# Patient Record
Sex: FEMALE
[De-identification: names, ages, dates, MRNs, and addresses within clinical notes are randomized per-mention and may not be internally consistent; named-entity substitution may affect disease eponyms.]

---

## 2018-10-12 PROBLEM — Z00.00 ENCOUNTER FOR PREVENTIVE HEALTH EXAMINATION: Status: ACTIVE | Noted: 2018-10-12

## 2018-10-15 ENCOUNTER — APPOINTMENT (OUTPATIENT)
Dept: ENDOCRINOLOGY | Facility: CLINIC | Age: 38
End: 2018-10-15
Payer: MEDICAID

## 2018-10-15 VITALS
BODY MASS INDEX: 26.87 KG/M2 | SYSTOLIC BLOOD PRESSURE: 120 MMHG | HEIGHT: 62 IN | WEIGHT: 146 LBS | DIASTOLIC BLOOD PRESSURE: 80 MMHG | HEART RATE: 73 BPM

## 2018-10-15 PROCEDURE — 99203 OFFICE O/P NEW LOW 30 MIN: CPT

## 2018-12-10 ENCOUNTER — APPOINTMENT (OUTPATIENT)
Dept: ENDOCRINOLOGY | Facility: CLINIC | Age: 38
End: 2018-12-10
Payer: MEDICAID

## 2018-12-10 VITALS
HEIGHT: 62 IN | WEIGHT: 143 LBS | DIASTOLIC BLOOD PRESSURE: 80 MMHG | HEART RATE: 90 BPM | BODY MASS INDEX: 26.31 KG/M2 | SYSTOLIC BLOOD PRESSURE: 120 MMHG

## 2018-12-10 PROCEDURE — 99215 OFFICE O/P EST HI 40 MIN: CPT

## 2019-02-25 ENCOUNTER — APPOINTMENT (OUTPATIENT)
Dept: ENDOCRINOLOGY | Facility: CLINIC | Age: 39
End: 2019-02-25
Payer: MEDICAID

## 2019-02-25 VITALS
HEIGHT: 62 IN | HEART RATE: 100 BPM | SYSTOLIC BLOOD PRESSURE: 120 MMHG | WEIGHT: 143 LBS | DIASTOLIC BLOOD PRESSURE: 80 MMHG | BODY MASS INDEX: 26.31 KG/M2

## 2019-02-25 PROCEDURE — 99215 OFFICE O/P EST HI 40 MIN: CPT

## 2019-02-25 RX ORDER — DULAGLUTIDE 0.75 MG/.5ML
0.75 INJECTION, SOLUTION SUBCUTANEOUS
Qty: 1 | Refills: 3 | Status: COMPLETED | COMMUNITY
Start: 2018-12-10 | End: 2019-02-25

## 2019-02-25 NOTE — END OF VISIT
Include Location In Plan?: No Detail Level: Generalized Detail Level: Simple [Time Spent: ___ minutes] : I have spent [unfilled] minutes of face to face time with the patient Detail Level: Zone

## 2019-02-25 NOTE — ASSESSMENT
[FreeTextEntry1] : 1) DM2: Improving control, A1C on 11/2018 at 6.5%. target of <7%. Natural hx of the disease and importance of treatment targets discussed at length, she verbalized understanding. ADA diet and importance of exercise discussed at length. Plan is to increase metformin ER to full dose as tolerated and introduce SGLt-2 inh today (farxiga 5 mg PO QD). r/b/a and s.e including polyuria, UTI and pascale's reviewed at length. Verbalized understanding and agrees with treatment plan, will contact MD and seek emergency medical care if condition changes.\par microalbumin, lipids and labs checked on 12/2018, microalbumin negative and CMP wnl. Discuss vaccines and podiatry/opthalmology referrals on NV \par 2) Weight gain:  complicated by DM2. Discussed medical  strategies. Pt would like to try lifestyle modifications and SGLT-2 inh therapy in the future. Reassess on the NV for at least ~5% TBW loss.\par 3) Dyslipidemia: LDL at 101 on 12/2018. P twould like to try LSM and weight loss first. No plans for pregnancy so we will consider statin in the future.\par  [Carbohydrate Consistent Diet] : carbohydrate consistent diet [Long Term Vascular Complications] : long term vascular complications of diabetes [Importance of Diet and Exercise] : importance of diet and exercise to improve glycemic control, achieve weight loss and improve cardiovascular health [Incretin Mimetic Therapy] : Risks and benefits of incretin mimetic therapy were discussed with the patient including nauseau, pancreatitis and potential risk of medullary thyroid cancer

## 2019-02-25 NOTE — HISTORY OF PRESENT ILLNESS
[FreeTextEntry1] : 38 y.o F e/ Hx of DM2.\par Here for initial evaluation, generally feels well and endorses no acute complaints.\par Reports original diagnosis was made w/ gestational DM 3 years ago. Used glyburide. Baby was born healthy w/o macrosomia per pt. Since, she reports inadequate A1C control. Last A1C she remember was 11, which droppped to 6 on metfomrin 1000 HLC QD. She reports GI heartburn and cramps. Saw ophthalmology this year. Is not aware of any other micro-macrovascular complications. \par \par 12/2018: Here for /fu, generally feels well and endorses no acute complaints. No interval events since LV. Today notes 4 lb weight loss since LV.\par She monitors FSG at home, reports AC Brk readings usually ~140 since restarting metformin 2 months ago. \par \par 2/2019: Here for /fu, generally feels well and endorses no acute complaints. No interval events since LV. Today reports she was unable to tolerate Trulicity (reports worsening fatigue and stomach cramps). Still reports post prandial hyperglycemia ~180. Notes full metformin tolerance.\par She otherwise denies any f/c, CP, SOB, palpitations, tremors, depressed mood, anxiety, palpitations, n/v, stool/urinary abn, skin/weight changes, heat/cold intolerance, HAs, breast/nipple changes, polyuria/polydipsia/nocturia or other complaints.\par

## 2019-04-29 ENCOUNTER — APPOINTMENT (OUTPATIENT)
Dept: ENDOCRINOLOGY | Facility: CLINIC | Age: 39
End: 2019-04-29
Payer: MEDICAID

## 2019-04-29 VITALS
HEART RATE: 59 BPM | DIASTOLIC BLOOD PRESSURE: 80 MMHG | HEIGHT: 62 IN | WEIGHT: 147 LBS | BODY MASS INDEX: 27.05 KG/M2 | SYSTOLIC BLOOD PRESSURE: 118 MMHG

## 2019-04-29 PROCEDURE — 99215 OFFICE O/P EST HI 40 MIN: CPT

## 2019-04-29 RX ORDER — AMMONIUM LACTATE 12 %
12 CREAM (GRAM) TOPICAL
Qty: 1 | Refills: 5 | Status: ACTIVE | COMMUNITY
Start: 2019-04-29 | End: 1900-01-01

## 2019-04-29 RX ORDER — BUPROPION HYDROCHLORIDE 150 MG/1
150 TABLET, EXTENDED RELEASE ORAL
Qty: 120 | Refills: 5 | Status: ACTIVE | COMMUNITY
Start: 2019-04-29 | End: 1900-01-01

## 2019-04-29 NOTE — HISTORY OF PRESENT ILLNESS
[FreeTextEntry1] : 38 y.o F e/ Hx of DM2.\par Here for initial evaluation, generally feels well and endorses no acute complaints.\par Reports original diagnosis was made w/ gestational DM 3 years ago. Used glyburide. Baby was born healthy w/o macrosomia per pt. Since, she reports inadequate A1C control. Last A1C she remember was 11, which droppped to 6 on metfomrin 1000 HLC QD. She reports GI heartburn and cramps. Saw ophthalmology this year. Is not aware of any other micro-macrovascular complications. \par \par 12/2018: Here for /fu, generally feels well and endorses no acute complaints. No interval events since LV. Today notes 4 lb weight loss since LV.\par She monitors FSG at home, reports AC Brk readings usually ~140 since restarting metformin 2 months ago. \par \par 2/2019: Here for /fu, generally feels well and endorses no acute complaints. No interval events since LV. Today reports she was unable to tolerate Trulicity (reports worsening fatigue and stomach cramps). Still reports post prandial hyperglycemia ~180. Notes full metformin tolerance.\par \par \par 4/2019: Here for /fu, generally feels well and endorses no acute complaints. No interval events since LV. Today reports full tolerance of farxiga, denies UTIs, vaginosis or polyuria. Weight increased but reports increased anxiety driven binge eating and appetite.\par She otherwise denies any f/c, CP, SOB, palpitations, tremors, depressed mood, anxiety, palpitations, n/v, stool/urinary abn, skin/weight changes, heat/cold intolerance, HAs, breast/nipple changes, polyuria/polydipsia/nocturia or other complaints.\par

## 2019-04-29 NOTE — ASSESSMENT
[Carbohydrate Consistent Diet] : carbohydrate consistent diet [Long Term Vascular Complications] : long term vascular complications of diabetes [Importance of Diet and Exercise] : importance of diet and exercise to improve glycemic control, achieve weight loss and improve cardiovascular health [Incretin Mimetic Therapy] : Risks and benefits of incretin mimetic therapy were discussed with the patient including nauseau, pancreatitis and potential risk of medullary thyroid cancer [FreeTextEntry1] : 1) DM2: Improving control, A1C on 11/2018 at 6.5%. target of <7%. Natural hx of the disease and importance of treatment targets discussed at length, she verbalized understanding. ADA diet and importance of exercise discussed at length. Plan is to increase metformin ER to full dose as tolerated and introduce SGLt-2 inh today (farxiga 5 mg PO QD). r/b/a and s.e including polyuria, UTI and pascale's reviewed at length. Verbalized understanding and agrees with treatment plan, will contact MD and seek emergency medical care if condition changes.\par microalbumin, lipids and labs checked on 12/2018, microalbumin negative and CMP wnl. Discuss vaccines and podiatry/opthalmology referrals on NV \par 2) Weight gain:  complicated by DM2. Discussed medical  strategies. Pt would like to try lifestyle modifications and SGLT-2 inh therapy in the future. Reassess on the NV for at least ~5% TBW loss. Start bupropion and titrate as tolerated for anxiety driven binge eating.\par 3) Dyslipidemia: LDL at 101 on 12/2018. P twould like to try LSM and weight loss first. No plans for pregnancy so we will consider statin in the future.\par

## 2019-07-29 ENCOUNTER — APPOINTMENT (OUTPATIENT)
Dept: ENDOCRINOLOGY | Facility: CLINIC | Age: 39
End: 2019-07-29
Payer: MEDICAID

## 2019-07-29 VITALS
HEIGHT: 62 IN | HEART RATE: 72 BPM | DIASTOLIC BLOOD PRESSURE: 80 MMHG | SYSTOLIC BLOOD PRESSURE: 120 MMHG | BODY MASS INDEX: 20.98 KG/M2 | WEIGHT: 114 LBS

## 2019-07-29 PROCEDURE — 99214 OFFICE O/P EST MOD 30 MIN: CPT

## 2019-07-29 NOTE — PHYSICAL EXAM
[Alert] : alert [No Acute Distress] : no acute distress [Well Nourished] : well nourished [Well Developed] : well developed [Normal Sclera/Conjunctiva] : normal sclera/conjunctiva [EOMI] : extra ocular movement intact [Normal Oropharynx] : the oropharynx was normal [No Proptosis] : no proptosis [No Thyroid Nodules] : there were no palpable thyroid nodules [Thyroid Not Enlarged] : the thyroid was not enlarged [Clear to Auscultation] : lungs were clear to auscultation bilaterally [No Accessory Muscle Use] : no accessory muscle use [No Respiratory Distress] : no respiratory distress [Normal Rate] : heart rate was normal  [Normal S1, S2] : normal S1 and S2 [Regular Rhythm] : with a regular rhythm [Pedal Pulses Normal] : the pedal pulses are present [No Edema] : there was no peripheral edema [Normal Bowel Sounds] : normal bowel sounds [Not Tender] : non-tender [Soft] : abdomen soft [Not Distended] : not distended [Post Cervical Nodes] : posterior cervical nodes [Anterior Cervical Nodes] : anterior cervical nodes [Axillary Nodes] : axillary nodes [Normal] : normal and non tender [No Spinal Tenderness] : no spinal tenderness [Spine Straight] : spine straight [No Stigmata of Cushings Syndrome] : no stigmata of cushings syndrome [Normal Gait] : normal gait [Normal Strength/Tone] : muscle strength and tone were normal [Acanthosis Nigricans] : no acanthosis nigricans [Normal Reflexes] : deep tendon reflexes were 2+ and symmetric [No Rash] : no rash [No Tremors] : no tremors [Oriented x3] : oriented to person, place, and time

## 2019-07-29 NOTE — ASSESSMENT
[FreeTextEntry1] : 1) DM2: Improving control, A1C on 7/2019 at 7.2%. target of <7%. Natural hx of the disease and importance of treatment targets discussed at length, she verbalized understanding. ADA diet and importance of exercise discussed at length. Plan is to increase metformin ER to full dose as tolerated and SGLt-2 inh today (farxiga 5 mg PO QD). r/b/a and s.e including polyuria, UTI and pascale's reviewed at length. Verbalized understanding and agrees with treatment plan, will contact MD and seek emergency medical care if condition changes.\par microalbumin, lipids and labs checked on 12/2018, microalbumin negative and CMP wnl. Discuss vaccines and podiatry/opthalmology referrals on NV \par 2) Weight gain:  complicated by DM2. Discussed medical  strategies. Pt would like to try lifestyle modifications and SGLT-2 inh therapy in the future. Reassess on the NV for at least ~5% TBW loss. Start bupropion and titrate as tolerated for anxiety driven binge eating.\par 3) Dyslipidemia: LDL at 101 on 12/2018. P twould like to try LSM and weight loss first. No plans for pregnancy so we will consider statin in the future.\par  [Carbohydrate Consistent Diet] : carbohydrate consistent diet [Importance of Diet and Exercise] : importance of diet and exercise to improve glycemic control, achieve weight loss and improve cardiovascular health [Long Term Vascular Complications] : long term vascular complications of diabetes [Incretin Mimetic Therapy] : Risks and benefits of incretin mimetic therapy were discussed with the patient including nauseau, pancreatitis and potential risk of medullary thyroid cancer

## 2019-07-29 NOTE — HISTORY OF PRESENT ILLNESS
[FreeTextEntry1] : 38 y.o F e/ Hx of DM2.\par Here for initial evaluation, generally feels well and endorses no acute complaints.\par Reports original diagnosis was made w/ gestational DM 3 years ago. Used glyburide. Baby was born healthy w/o macrosomia per pt. Since, she reports inadequate A1C control. Last A1C she remember was 11, which droppped to 6 on metfomrin 1000 HLC QD. She reports GI heartburn and cramps. Saw ophthalmology this year. Is not aware of any other micro-macrovascular complications. \par \par 12/2018: Here for /fu, generally feels well and endorses no acute complaints. No interval events since LV. Today notes 4 lb weight loss since LV.\par She monitors FSG at home, reports AC Brk readings usually ~140 since restarting metformin 2 months ago. \par \par 2/2019: Here for /fu, generally feels well and endorses no acute complaints. No interval events since LV. Today reports she was unable to tolerate Trulicity (reports worsening fatigue and stomach cramps). Still reports post prandial hyperglycemia ~180. Notes full metformin tolerance.\par \par \par 7/2019: Here for /fu, generally feels well and endorses no acute complaints. No interval events since LV. Today reports full tolerance of farxiga, denies UTIs, vaginosis or polyuria. Weight increased but reports increased anxiety driven binge eating and appetite.\par She otherwise denies any f/c, CP, SOB, palpitations, tremors, depressed mood, anxiety, palpitations, n/v, stool/urinary abn, skin/weight changes, heat/cold intolerance, HAs, breast/nipple changes, polyuria/polydipsia/nocturia or other complaints.\par

## 2019-11-25 ENCOUNTER — APPOINTMENT (OUTPATIENT)
Dept: ENDOCRINOLOGY | Facility: CLINIC | Age: 39
End: 2019-11-25
Payer: MEDICAID

## 2019-11-25 VITALS
BODY MASS INDEX: 26.87 KG/M2 | SYSTOLIC BLOOD PRESSURE: 110 MMHG | DIASTOLIC BLOOD PRESSURE: 70 MMHG | HEART RATE: 79 BPM | HEIGHT: 62 IN | WEIGHT: 146 LBS

## 2019-11-25 PROCEDURE — 99215 OFFICE O/P EST HI 40 MIN: CPT

## 2019-11-25 RX ORDER — LANCETS 33 GAUGE
EACH MISCELLANEOUS
Qty: 1 | Refills: 11 | Status: ACTIVE | COMMUNITY
Start: 2019-11-25 | End: 1900-01-01

## 2019-11-25 RX ORDER — BLOOD SUGAR DIAGNOSTIC
STRIP MISCELLANEOUS
Qty: 1 | Refills: 11 | Status: ACTIVE | COMMUNITY
Start: 2019-11-25 | End: 1900-01-01

## 2019-11-25 NOTE — ASSESSMENT
[Carbohydrate Consistent Diet] : carbohydrate consistent diet [Long Term Vascular Complications] : long term vascular complications of diabetes [Importance of Diet and Exercise] : importance of diet and exercise to improve glycemic control, achieve weight loss and improve cardiovascular health [Incretin Mimetic Therapy] : Risks and benefits of incretin mimetic therapy were discussed with the patient including nauseau, pancreatitis and potential risk of medullary thyroid cancer [FreeTextEntry1] : 1) DM2: Improving control, A1C on 7/2019 at 7.2%. target of <7%. Natural hx of the disease and importance of treatment targets discussed at length, she verbalized understanding. ADA diet and importance of exercise discussed at length. Plan is to increase metformin ER to full dose as tolerated and SGLt-2 inh today (farxiga 5 mg PO QD). r/b/a and s.e including polyuria, UTI and pascale's reviewed at length. Verbalized understanding and agrees with treatment plan, will contact MD and seek emergency medical care if condition changes.\par microalbumin, lipids and labs checked on 12/2018, microalbumin negative and CMP wnl. Discuss vaccines and podiatry/opthalmology referrals on NV \par 2) Weight gain:  complicated by DM2. Discussed medical  strategies. Pt would like to try lifestyle modifications and SGLT-2 inh therapy in the future. Reassess on the NV for at least ~5% TBW loss. Start bupropion and titrate as tolerated for anxiety driven binge eating.\par 3) Dyslipidemia: LDL at 101 on 12/2018. P twould like to try LSM and weight loss first. No plans for pregnancy so we will consider statin in the future.\par

## 2020-03-02 ENCOUNTER — APPOINTMENT (OUTPATIENT)
Dept: ENDOCRINOLOGY | Facility: CLINIC | Age: 40
End: 2020-03-02
Payer: MEDICAID

## 2020-03-02 VITALS
DIASTOLIC BLOOD PRESSURE: 70 MMHG | HEART RATE: 68 BPM | HEIGHT: 62 IN | BODY MASS INDEX: 27.23 KG/M2 | SYSTOLIC BLOOD PRESSURE: 110 MMHG | WEIGHT: 148 LBS

## 2020-03-02 PROCEDURE — 99214 OFFICE O/P EST MOD 30 MIN: CPT

## 2020-03-02 RX ORDER — SITAGLIPTIN AND METFORMIN HYDROCHLORIDE 100; 1000 MG/1; MG/1
100-1000 TABLET, FILM COATED, EXTENDED RELEASE ORAL DAILY
Qty: 90 | Refills: 3 | Status: COMPLETED | COMMUNITY
Start: 2019-11-25 | End: 2020-03-02

## 2020-03-02 NOTE — HISTORY OF PRESENT ILLNESS
[FreeTextEntry1] : 39 y.o F e/ Hx of DM2.\par Here for initial evaluation, generally feels well and endorses no acute complaints.\par Reports original diagnosis was made w/ gestational DM 3 years ago. Used glyburide. Baby was born healthy w/o macrosomia per pt. Since, she reports inadequate A1C control. Last A1C she remember was 11, which droppped to 6 on metfomrin 1000 HLC QD. She reports GI heartburn and cramps. Saw ophthalmology this year. Is not aware of any other micro-macrovascular complications. \par \par 12/2018: Here for /fu, generally feels well and endorses no acute complaints. No interval events since LV. Today notes 4 lb weight loss since LV.\par She monitors FSG at home, reports AC Brk readings usually ~140 since restarting metformin 2 months ago. \par \par 2/2019: Here for /fu, generally feels well and endorses no acute complaints. No interval events since LV. Today reports she was unable to tolerate Trulicity (reports worsening fatigue and stomach cramps). Still reports post prandial hyperglycemia ~180. Notes full metformin tolerance.\par \par \par 3/2020: Here for /fu, generally feels well and endorses no acute complaints. No interval events since LV. Today reports full tolerance of farxiga, denies UTIs, vaginosis or polyuria. Weight increased but reports increased anxiety driven binge eating and appetite.\par She otherwise denies any f/c, CP, SOB, palpitations, tremors, depressed mood, anxiety, palpitations, n/v, stool/urinary abn, skin/weight changes, heat/cold intolerance, HAs, breast/nipple changes, polyuria/polydipsia/nocturia or other complaints.\par

## 2020-03-02 NOTE — ASSESSMENT
[Long Term Vascular Complications] : long term vascular complications of diabetes [Carbohydrate Consistent Diet] : carbohydrate consistent diet [Incretin Mimetic Therapy] : Risks and benefits of incretin mimetic therapy were discussed with the patient including nauseau, pancreatitis and potential risk of medullary thyroid cancer [Importance of Diet and Exercise] : importance of diet and exercise to improve glycemic control, achieve weight loss and improve cardiovascular health [FreeTextEntry1] : 1) DM2: Improving control, A1C on 7/2019 at 7.2%. target of <7%. Natural hx of the disease and importance of treatment targets discussed at length, she verbalized understanding. ADA diet and importance of exercise discussed at length. Plan is to increase metformin  mg to max tolerated dose of 1 GM and SGLt-2 inh today (farxiga 10 mg PO QD). r/b/a and s.e including polyuria, UTI and pascale's reviewed at length. Verbalized understanding and agrees with treatment plan, will contact MD and seek emergency medical care if condition changes. Hold Januvia given GI discomfort.\par microalbumin, lipids and labs checked on 12/2018, microalbumin negative and CMP wnl. Discuss vaccines and podiatry/opthalmology referrals on NV \par \par 2) Weight gain:  complicated by DM2. Discussed medical  strategies. Pt would like to try lifestyle modifications and SGLT-2 inh therapy in the future. Reassess on the NV for at least ~5% TBW loss. Start bupropion and titrate as tolerated for anxiety driven binge eating.\par \par 3) Dyslipidemia: LDL at 101 on 12/2018. P twould like to try LSM and weight loss first. No plans for pregnancy so we will consider statin in the future.\par

## 2020-03-02 NOTE — PHYSICAL EXAM
[Alert] : alert [No Acute Distress] : no acute distress [Well Nourished] : well nourished [Well Developed] : well developed [Normal Sclera/Conjunctiva] : normal sclera/conjunctiva [EOMI] : extra ocular movement intact [Normal Oropharynx] : the oropharynx was normal [No Proptosis] : no proptosis [Thyroid Not Enlarged] : the thyroid was not enlarged [No Thyroid Nodules] : there were no palpable thyroid nodules [No Respiratory Distress] : no respiratory distress [Clear to Auscultation] : lungs were clear to auscultation bilaterally [No Accessory Muscle Use] : no accessory muscle use [Normal S1, S2] : normal S1 and S2 [Normal Rate] : heart rate was normal  [Regular Rhythm] : with a regular rhythm [Pedal Pulses Normal] : the pedal pulses are present [Normal Bowel Sounds] : normal bowel sounds [No Edema] : there was no peripheral edema [Not Tender] : non-tender [Soft] : abdomen soft [Not Distended] : not distended [Post Cervical Nodes] : posterior cervical nodes [Anterior Cervical Nodes] : anterior cervical nodes [Axillary Nodes] : axillary nodes [Normal] : normal and non tender [No Spinal Tenderness] : no spinal tenderness [Spine Straight] : spine straight [Normal Gait] : normal gait [No Stigmata of Cushings Syndrome] : no stigmata of cushings syndrome [No Rash] : no rash [Normal Strength/Tone] : muscle strength and tone were normal [Normal Reflexes] : deep tendon reflexes were 2+ and symmetric [No Tremors] : no tremors [Oriented x3] : oriented to person, place, and time [Acanthosis Nigricans] : no acanthosis nigricans

## 2020-06-01 ENCOUNTER — APPOINTMENT (OUTPATIENT)
Dept: ENDOCRINOLOGY | Facility: CLINIC | Age: 40
End: 2020-06-01

## 2020-08-03 ENCOUNTER — APPOINTMENT (OUTPATIENT)
Dept: ENDOCRINOLOGY | Facility: CLINIC | Age: 40
End: 2020-08-03
Payer: MEDICAID

## 2020-08-03 VITALS
HEART RATE: 67 BPM | WEIGHT: 140 LBS | SYSTOLIC BLOOD PRESSURE: 110 MMHG | BODY MASS INDEX: 25.76 KG/M2 | DIASTOLIC BLOOD PRESSURE: 70 MMHG | HEIGHT: 62 IN

## 2020-08-03 PROCEDURE — 99214 OFFICE O/P EST MOD 30 MIN: CPT

## 2020-08-03 RX ORDER — BLOOD-GLUCOSE METER
W/DEVICE EACH MISCELLANEOUS
Qty: 1 | Refills: 0 | Status: ACTIVE | COMMUNITY
Start: 2019-11-25 | End: 1900-01-01

## 2020-08-03 NOTE — ASSESSMENT
[FreeTextEntry1] : 1) DM2: Improving control, A1C on 7/2019 at 7.2%. target of <7%. Natural hx of the disease and importance of treatment targets discussed at length, she verbalized understanding. ADA diet and importance of exercise discussed at length. Plan is to increase metformin  mg to max tolerated dose of 1 GM and SGLt-2 inh today (farxiga 10 mg PO QD). r/b/a and s.e including polyuria, UTI and pascale's reviewed at length. Verbalized understanding and agrees with treatment plan, will contact MD and seek emergency medical care if condition changes. Hold Januvia given GI discomfort.\par microalbumin, lipids and labs checked on 12/2018, microalbumin negative and CMP wnl. Discuss vaccines and podiatry/opthalmology referrals on NV \par \par 2) Weight gain:  complicated by DM2. Discussed medical  strategies. Pt would like to try lifestyle modifications and SGLT-2 inh therapy in the future. Reassess on the NV for at least ~5% TBW loss. Start bupropion and titrate as tolerated for anxiety driven binge eating.\par \par 3) Dyslipidemia: LDL at 101 on 12/2018. P twould like to try LSM and weight loss first. No plans for pregnancy so we will consider statin in the future.\par  [Long Term Vascular Complications] : long term vascular complications of diabetes [Carbohydrate Consistent Diet] : carbohydrate consistent diet [Incretin Mimetic Therapy] : Risks and benefits of incretin mimetic therapy were discussed with the patient including nauseau, pancreatitis and potential risk of medullary thyroid cancer [Importance of Diet and Exercise] : importance of diet and exercise to improve glycemic control, achieve weight loss and improve cardiovascular health

## 2020-08-03 NOTE — HISTORY OF PRESENT ILLNESS
[FreeTextEntry1] : 39 y.o F e/ Hx of DM2.\par Here for initial evaluation, generally feels well and endorses no acute complaints.\par Reports original diagnosis was made w/ gestational DM 3 years ago. Used glyburide. Baby was born healthy w/o macrosomia per pt. Since, she reports inadequate A1C control. Last A1C she remember was 11, which droppped to 6 on metfomrin 1000 HLC QD. She reports GI heartburn and cramps. Saw ophthalmology this year. Is not aware of any other micro-macrovascular complications. \par \par 12/2018: Here for /fu, generally feels well and endorses no acute complaints. No interval events since LV. Today notes 4 lb weight loss since LV.\par She monitors FSG at home, reports AC Brk readings usually ~140 since restarting metformin 2 months ago. \par \par 2/2019: Here for /fu, generally feels well and endorses no acute complaints. No interval events since LV. Today reports she was unable to tolerate Trulicity (reports worsening fatigue and stomach cramps). Still reports post prandial hyperglycemia ~180. Notes full metformin tolerance.\par \par \par 8/2020: Here for /fu, generally feels well and endorses no acute complaints. No interval events since LV. Today reports full tolerance of farxiga, denies UTIs, vaginosis or polyuria. Weight increased but reports increased anxiety driven binge eating and appetite.\par She otherwise denies any f/c, CP, SOB, palpitations, tremors, depressed mood, anxiety, palpitations, n/v, stool/urinary abn, skin/weight changes, heat/cold intolerance, HAs, breast/nipple changes, polyuria/polydipsia/nocturia or other complaints.\par

## 2020-11-23 ENCOUNTER — APPOINTMENT (OUTPATIENT)
Dept: ENDOCRINOLOGY | Facility: CLINIC | Age: 40
End: 2020-11-23
Payer: MEDICAID

## 2020-11-23 VITALS
WEIGHT: 143 LBS | DIASTOLIC BLOOD PRESSURE: 80 MMHG | HEART RATE: 70 BPM | SYSTOLIC BLOOD PRESSURE: 110 MMHG | BODY MASS INDEX: 26.31 KG/M2 | HEIGHT: 62 IN

## 2020-11-23 DIAGNOSIS — R63.5 ABNORMAL WEIGHT GAIN: ICD-10-CM

## 2020-11-23 PROCEDURE — 99215 OFFICE O/P EST HI 40 MIN: CPT

## 2020-11-23 NOTE — HISTORY OF PRESENT ILLNESS
[FreeTextEntry1] : 40 y.o F e/ Hx of DM2.\par Here for initial evaluation, generally feels well and endorses no acute complaints.\par Reports original diagnosis was made w/ gestational DM 3 years ago. Used glyburide. Baby was born healthy w/o macrosomia per pt. Since, she reports inadequate A1C control. Last A1C she remember was 11, which droppped to 6 on metfomrin 1000 HLC QD. She reports GI heartburn and cramps. Saw ophthalmology this year. Is not aware of any other micro-macrovascular complications. \par \par 12/2018: Here for /fu, generally feels well and endorses no acute complaints. No interval events since LV. Today notes 4 lb weight loss since LV.\par She monitors FSG at home, reports AC Brk readings usually ~140 since restarting metformin 2 months ago. \par \par 2/2019: Here for /fu, generally feels well and endorses no acute complaints. No interval events since LV. Today reports she was unable to tolerate Trulicity (reports worsening fatigue and stomach cramps). Still reports post prandial hyperglycemia ~180. Notes full metformin tolerance.\par \par \par 11/2020: Here for /fu, generally feels well and endorses no acute complaints. No interval events since LV. Today reports full tolerance of farxiga, denies UTIs, vaginosis or polyuria. Weight increased but reports increased anxiety driven binge eating and appetite. pt is adamantly against further metformin use, is interested on trying incretin therapy once more. did not tolerate trulicity in the past, but is open to trying rybelsus.\par She otherwise denies any f/c, CP, SOB, palpitations, tremors, depressed mood, anxiety, palpitations, n/v, stool/urinary abn, skin/weight changes, heat/cold intolerance, HAs, breast/nipple changes, polyuria/polydipsia/nocturia or other complaints.\par

## 2020-11-23 NOTE — ASSESSMENT
[Carbohydrate Consistent Diet] : carbohydrate consistent diet [Long Term Vascular Complications] : long term vascular complications of diabetes [Importance of Diet and Exercise] : importance of diet and exercise to improve glycemic control, achieve weight loss and improve cardiovascular health [Incretin Mimetic Therapy] : Risks and benefits of incretin mimetic therapy were discussed with the patient including nauseau, pancreatitis and potential risk of medullary thyroid cancer [FreeTextEntry1] : 1) DM2: Improving control, A1C on 7/2019 at 7.2%. target of <7%. Natural hx of the disease and importance of treatment targets discussed at length, she verbalized understanding. ADA diet and importance of exercise discussed at length. Plan is to hold metformin  mg and cont SGLt-2 inh today (farxiga 10 mg PO QD). r/b/a and s.e including polyuria, UTI and pascale's reviewed at length. Verbalized understanding and agrees with treatment plan, will contact MD and seek emergency medical care if condition changes. Re challenge w/ rybelsus 3 mg PO QD AC Brk.\par microalbumin, lipids and labs checked on 1/2020, microalbumin negative and CMP wnl. Discuss vaccines and podiatry/opthalmology referrals on NV \par \par 2) Weight gain:  complicated by DM2. Discussed medical  strategies. Pt would like to try lifestyle modifications and SGLT-2 inh therapy in the future. Reassess on the NV for at least ~5% TBW loss. Start bupropion and titrate as tolerated for anxiety driven binge eating.\par \par 3) Dyslipidemia: LDL at 101 on 12/2018. P twould like to try LSM and weight loss first. No plans for pregnancy so we will consider statin in the future.\par

## 2021-02-22 ENCOUNTER — APPOINTMENT (OUTPATIENT)
Dept: ENDOCRINOLOGY | Facility: CLINIC | Age: 41
End: 2021-02-22
Payer: MEDICAID

## 2021-02-22 VITALS
HEART RATE: 73 BPM | SYSTOLIC BLOOD PRESSURE: 130 MMHG | DIASTOLIC BLOOD PRESSURE: 90 MMHG | HEIGHT: 62 IN | WEIGHT: 146 LBS | BODY MASS INDEX: 26.87 KG/M2

## 2021-02-22 PROCEDURE — 99072 ADDL SUPL MATRL&STAF TM PHE: CPT

## 2021-02-22 PROCEDURE — 99214 OFFICE O/P EST MOD 30 MIN: CPT

## 2021-02-22 NOTE — HISTORY OF PRESENT ILLNESS
[FreeTextEntry1] : 40 y.o F e/ Hx of DM2.\par Here for initial evaluation, generally feels well and endorses no acute complaints.\par Reports original diagnosis was made w/ gestational DM 3 years ago. Used glyburide. Baby was born healthy w/o macrosomia per pt. Since, she reports inadequate A1C control. Last A1C she remember was 11, which droppped to 6 on metfomrin 1000 HLC QD. She reports GI heartburn and cramps. Saw ophthalmology this year. Is not aware of any other micro-macrovascular complications. \par \par 12/2018: Here for /fu, generally feels well and endorses no acute complaints. No interval events since LV. Today notes 4 lb weight loss since LV.\par She monitors FSG at home, reports AC Brk readings usually ~140 since restarting metformin 2 months ago. \par \par 2/2019: Here for /fu, generally feels well and endorses no acute complaints. No interval events since LV. Today reports she was unable to tolerate Trulicity (reports worsening fatigue and stomach cramps). Still reports post prandial hyperglycemia ~180. Notes full metformin tolerance.\par \par \par 2/2021: Here for /fu, generally feels well and endorses no acute complaints. No interval events since LV. Today reports full tolerance of farxiga, denies UTIs, vaginosis or polyuria. Weight increased but reports increased anxiety driven binge eating and appetite. pt is adamantly against further metformin use, is interested on trying incretin therapy once more. did not tolerate trulicity in the past, but is open to trying rybelsus.\par She otherwise denies any f/c, CP, SOB, palpitations, tremors, depressed mood, anxiety, palpitations, n/v, stool/urinary abn, skin/weight changes, heat/cold intolerance, HAs, breast/nipple changes, polyuria/polydipsia/nocturia or other complaints.\par

## 2021-08-02 ENCOUNTER — APPOINTMENT (OUTPATIENT)
Dept: ENDOCRINOLOGY | Facility: CLINIC | Age: 41
End: 2021-08-02
Payer: MEDICAID

## 2021-08-02 VITALS
BODY MASS INDEX: 25.95 KG/M2 | HEART RATE: 77 BPM | WEIGHT: 141 LBS | DIASTOLIC BLOOD PRESSURE: 80 MMHG | SYSTOLIC BLOOD PRESSURE: 120 MMHG | HEIGHT: 62 IN

## 2021-08-02 PROCEDURE — 99215 OFFICE O/P EST HI 40 MIN: CPT

## 2021-08-02 RX ORDER — CALCIUM CARBONATE/VITAMIN D3 600 MG-10
1200 TABLET ORAL
Qty: 360 | Refills: 3 | Status: ACTIVE | COMMUNITY
Start: 2021-08-02 | End: 1900-01-01

## 2021-08-02 NOTE — HISTORY OF PRESENT ILLNESS
[FreeTextEntry1] : 40 y.o F e/ Hx of DM2.\par Here for initial evaluation, generally feels well and endorses no acute complaints.\par Reports original diagnosis was made w/ gestational DM 3 years ago. Used glyburide. Baby was born healthy w/o macrosomia per pt. Since, she reports inadequate A1C control. Last A1C she remember was 11, which droppped to 6 on metfomrin 1000 HLC QD. She reports GI heartburn and cramps. Saw ophthalmology this year. Is not aware of any other micro-macrovascular complications. \par \par 12/2018: Here for /fu, generally feels well and endorses no acute complaints. No interval events since LV. Today notes 4 lb weight loss since LV.\par She monitors FSG at home, reports AC Brk readings usually ~140 since restarting metformin 2 months ago. \par \par 2/2019: Here for /fu, generally feels well and endorses no acute complaints. No interval events since LV. Today reports she was unable to tolerate Trulicity (reports worsening fatigue and stomach cramps). Still reports post prandial hyperglycemia ~180. Notes full metformin tolerance.\par \par \par 8/2021: Here for /fu, generally feels well and endorses no acute complaints. No interval events since LV. Today reports full tolerance of farxiga, denies UTIs, vaginosis or polyuria. Weight increased but reports increased anxiety driven binge eating and appetite. pt is adamantly against further metformin use, is interested on trying incretin therapy once more. did not tolerate trulicity in the past, but is open to trying ozempic\par She otherwise denies any f/c, CP, SOB, palpitations, tremors, depressed mood, anxiety, palpitations, n/v, stool/urinary abn, skin/weight changes, heat/cold intolerance, HAs, breast/nipple changes, polyuria/polydipsia/nocturia or other complaints.\par

## 2021-08-02 NOTE — ASSESSMENT
[Carbohydrate Consistent Diet] : carbohydrate consistent diet [Long Term Vascular Complications] : long term vascular complications of diabetes [Importance of Diet and Exercise] : importance of diet and exercise to improve glycemic control, achieve weight loss and improve cardiovascular health [Incretin Mimetic Therapy] : Risks and benefits of incretin mimetic therapy were discussed with the patient including nauseau, pancreatitis and potential risk of medullary thyroid cancer [FreeTextEntry1] : 1) DM2: Improving control, A1C on 7/2021 at 7.1%. target of <7%. Natural hx of the disease and importance of treatment targets discussed at length, she verbalized understanding. ADA diet and importance of exercise discussed at length. Plan is to hold metformin  mg and cont SGLt-2 inh today (farxiga 10 mg PO QD). r/b/a and s.e including polyuria, UTI and pascale's reviewed at length. Verbalized understanding and agrees with treatment plan, will contact MD and seek emergency medical care if condition changes. Re challenge w/ rybelsus 3 mg PO QD AC Brk.\par microalbumin, lipids and labs checked on 1/2020, microalbumin negative and CMP wnl. Discuss vaccines and podiatry/opthalmology referrals on NV \par \par 2) Weight gain:  complicated by DM2. Discussed medical  strategies. Pt would like to try lifestyle modifications and SGLT-2 inh therapy in the future. Reassess on the NV for at least ~5% TBW loss. Start bupropion and titrate as tolerated for anxiety driven binge eating.\par \par 3) Dyslipidemia: LDL at 148 on 6/20121. P twould like to try LSM and weight loss first. No plans for pregnancy so we will consider statin in the future.\par

## 2021-11-01 ENCOUNTER — APPOINTMENT (OUTPATIENT)
Dept: ENDOCRINOLOGY | Facility: CLINIC | Age: 41
End: 2021-11-01
Payer: MEDICAID

## 2021-11-01 VITALS
HEIGHT: 62 IN | DIASTOLIC BLOOD PRESSURE: 70 MMHG | SYSTOLIC BLOOD PRESSURE: 110 MMHG | HEART RATE: 80 BPM | BODY MASS INDEX: 25.76 KG/M2 | WEIGHT: 140 LBS

## 2021-11-01 DIAGNOSIS — E78.5 TYPE 2 DIABETES MELLITUS WITH OTHER SPECIFIED COMPLICATION: ICD-10-CM

## 2021-11-01 DIAGNOSIS — E11.69 TYPE 2 DIABETES MELLITUS WITH OTHER SPECIFIED COMPLICATION: ICD-10-CM

## 2021-11-01 DIAGNOSIS — E11.65 TYPE 2 DIABETES MELLITUS WITH UNSPECIFIED COMPLICATIONS: ICD-10-CM

## 2021-11-01 DIAGNOSIS — E11.8 TYPE 2 DIABETES MELLITUS WITH UNSPECIFIED COMPLICATIONS: ICD-10-CM

## 2021-11-01 PROCEDURE — 99215 OFFICE O/P EST HI 40 MIN: CPT

## 2021-11-01 RX ORDER — ORAL SEMAGLUTIDE 7 MG/1
7 TABLET ORAL DAILY
Qty: 90 | Refills: 1 | Status: ACTIVE | COMMUNITY
Start: 2020-11-23 | End: 1900-01-01

## 2021-11-01 RX ORDER — SEMAGLUTIDE 1.34 MG/ML
2 INJECTION, SOLUTION SUBCUTANEOUS
Qty: 4.5 | Refills: 1 | Status: COMPLETED | COMMUNITY
Start: 2021-08-02 | End: 2021-11-01

## 2021-11-01 NOTE — HISTORY OF PRESENT ILLNESS
[FreeTextEntry1] : 40 y.o F e/ Hx of DM2.\par Here for initial evaluation, generally feels well and endorses no acute complaints.\par Reports original diagnosis was made w/ gestational DM 3 years ago. Used glyburide. Baby was born healthy w/o macrosomia per pt. Since, she reports inadequate A1C control. Last A1C she remember was 11, which droppped to 6 on metfomrin 1000 HLC QD. She reports GI heartburn and cramps. Saw ophthalmology this year. Is not aware of any other micro-macrovascular complications. \par \par 12/2018: Here for /fu, generally feels well and endorses no acute complaints. No interval events since LV. Today notes 4 lb weight loss since LV.\par She monitors FSG at home, reports AC Brk readings usually ~140 since restarting metformin 2 months ago. \par \par 2/2019: Here for /fu, generally feels well and endorses no acute complaints. No interval events since LV. Today reports she was unable to tolerate Trulicity (reports worsening fatigue and stomach cramps). Still reports post prandial hyperglycemia ~180. Notes full metformin tolerance.\par \par \par 11/2021: Here for /fu, generally feels well and endorses no acute complaints. No interval events since LV. Today reports not tolerating lowest dose of ozempic. has now not tolerated  med challenges w/ janumet/farxiga/rybelsus and ozempic. states she wants to go back to glyburide. pt is adamantly against further metformin use, is interested on trying incretin therapy once more w/ rybelsus. did not tolerate trulicity in the past\par She otherwise denies any f/c, CP, SOB, palpitations, tremors, depressed mood, anxiety, palpitations, n/v, stool/urinary abn, skin/weight changes, heat/cold intolerance, HAs, breast/nipple changes, polyuria/polydipsia/nocturia or other complaints.\par

## 2021-11-01 NOTE — ASSESSMENT
[Carbohydrate Consistent Diet] : carbohydrate consistent diet [Long Term Vascular Complications] : long term vascular complications of diabetes [Importance of Diet and Exercise] : importance of diet and exercise to improve glycemic control, achieve weight loss and improve cardiovascular health [Incretin Mimetic Therapy] : Risks and benefits of incretin mimetic therapy were discussed with the patient including nauseau, pancreatitis and potential risk of medullary thyroid cancer [FreeTextEntry1] : 1) DM2: Improving control, A1C on 7/2021 at 7.1%. target of <7%. Natural hx of the disease and importance of treatment targets discussed at length, she verbalized understanding. ADA diet and importance of exercise discussed at length. Plan is to hold metformin  mg and hold SGLt-2 inh today (farxiga 10 mg PO QD). r/b/a and s.e including polyuria, UTI and pascale's reviewed at length. Verbalized understanding and agrees with treatment plan, will contact MD and seek emergency medical care if condition changes.  rx rybelsus. d/c back to PCP as she is unwilling to try any additional meds other than glyburide.\par microalbumin, lipids and labs checked on 1/2020, microalbumin negative and CMP wnl. Discuss vaccines and podiatry/opthalmology referrals on NV \par \par 2) Weight gain:  complicated by DM2. Discussed medical  strategies. Pt would like to try lifestyle modifications and SGLT-2 inh therapy in the future. Reassess on the NV for at least ~5% TBW loss. Start bupropion and titrate as tolerated for anxiety driven binge eating.\par \par 3) Dyslipidemia: LDL at 148 on 6/20121. P twould like to try LSM and weight loss first. No plans for pregnancy so we will consider statin in the future.\par